# Patient Record
Sex: MALE | Race: WHITE | NOT HISPANIC OR LATINO | Employment: FULL TIME | URBAN - METROPOLITAN AREA
[De-identification: names, ages, dates, MRNs, and addresses within clinical notes are randomized per-mention and may not be internally consistent; named-entity substitution may affect disease eponyms.]

---

## 2019-04-08 ENCOUNTER — OFFICE VISIT (OUTPATIENT)
Dept: INTERNAL MEDICINE | Facility: CLINIC | Age: 34
End: 2019-04-08

## 2019-04-08 VITALS
BODY MASS INDEX: 27.98 KG/M2 | DIASTOLIC BLOOD PRESSURE: 80 MMHG | HEIGHT: 75 IN | WEIGHT: 225 LBS | HEART RATE: 72 BPM | SYSTOLIC BLOOD PRESSURE: 142 MMHG

## 2019-04-08 DIAGNOSIS — S39.011A STRAIN OF ABDOMINAL MUSCLE, INITIAL ENCOUNTER: Primary | ICD-10-CM

## 2019-04-08 PROBLEM — J30.9 ALLERGIC RHINITIS: Status: ACTIVE | Noted: 2019-04-08

## 2019-04-08 PROBLEM — E78.2 MIXED HYPERLIPIDEMIA: Status: ACTIVE | Noted: 2019-04-08

## 2019-04-08 PROBLEM — K40.90 RIGHT INGUINAL HERNIA: Status: ACTIVE | Noted: 2019-04-08

## 2019-04-08 PROCEDURE — 99213 OFFICE O/P EST LOW 20 MIN: CPT | Performed by: INTERNAL MEDICINE

## 2019-04-08 NOTE — PROGRESS NOTES
[facility]     Antonio Durham  1985   [id number]      Patient Care Team:  Shiva Johnson MD as PCP - General (Internal Medicine)    Chief Complaint::   Chief Complaint   Patient presents with   • Hernia   • Hyperlipidemia   • Allergic Rhinitis        HPI  Mr. Durham comes in concerned about lower abdominal pain, and is concerned he has a hernia.  He admits that he had been doing some abdominal exercises prior and then noticed pain not in the inguinal area but in the lower abdominal area.    Chronic Conditions:      Patient Active Problem List   Diagnosis   • Allergic rhinitis   • Mixed hyperlipidemia   • Right inguinal hernia        No past medical history on file.    No past surgical history on file.    Family History   Problem Relation Age of Onset   • Diabetes Father    • Coronary artery disease Father         premature   • Heart attack Father 36   • Coronary artery disease Maternal Uncle 40        premature   • Cancer Other    • Diabetes Other    • Heart attack Other    • Hypertension Other        Social History     Socioeconomic History   • Marital status:      Spouse name: Not on file   • Number of children: Not on file   • Years of education: Not on file   • Highest education level: Not on file   Tobacco Use   • Smoking status: Never Smoker   • Smokeless tobacco: Never Used   Substance and Sexual Activity   • Alcohol use: Yes   • Drug use: No   • Sexual activity: Defer       No Known Allergies    No current outpatient medications on file.    Review of Systems   Constitutional: Negative for chills, fatigue and fever.   HENT: Negative for congestion, ear pain and sinus pressure.    Respiratory: Negative for cough, chest tightness, shortness of breath and wheezing.    Cardiovascular: Negative for chest pain and palpitations.   Gastrointestinal: Positive for abdominal pain. Negative for blood in stool and constipation.   Skin: Negative for color change.   Allergic/Immunologic:  "Negative for environmental allergies.   Neurological: Negative for dizziness, speech difficulty and headache.   Psychiatric/Behavioral: Negative for decreased concentration. The patient is not nervous/anxious.         Vital Signs  Vitals:    04/08/19 1542   BP: 142/80   BP Location: Left arm   Patient Position: Sitting   Cuff Size: Adult   Pulse: 72   Weight: 102 kg (225 lb)   Height: 190.5 cm (75\")       Physical Exam   Constitutional: He is oriented to person, place, and time. He appears well-developed and well-nourished.   HENT:   Head: Normocephalic and atraumatic.   Cardiovascular: Normal rate, regular rhythm and normal heart sounds.   No murmur heard.  Pulmonary/Chest: Effort normal and breath sounds normal.   Abdominal: Soft. Bowel sounds are normal. He exhibits no mass. There is no tenderness. No hernia.   Neurological: He is alert and oriented to person, place, and time.   Psychiatric: He has a normal mood and affect.   Vitals reviewed.     Procedures      Assessment/Plan:    Abdominal wall muscle strain, no evidence of ventral hernia, he will reduce the abdominal portion of his workout for the next few days.    Plan of care reviewed with patient at the conclusion of today's visit. Education was provided regarding diagnosis, management, and any prescribed or recommended OTC medications.Patient verbalizes understanding of and agreement with management plan.         Shiva Johnson MD           "

## 2019-04-22 PROBLEM — Z00.00 ANNUAL PHYSICAL EXAM: Status: ACTIVE | Noted: 2019-04-22

## 2021-04-12 RX ORDER — PREDNISONE 10 MG/1
TABLET ORAL
Qty: 30 TABLET | Refills: 0 | Status: SHIPPED | OUTPATIENT
Start: 2021-04-12 | End: 2022-05-03

## 2022-05-03 ENCOUNTER — OFFICE VISIT (OUTPATIENT)
Dept: INTERNAL MEDICINE | Facility: CLINIC | Age: 37
End: 2022-05-03

## 2022-05-03 VITALS
WEIGHT: 252.4 LBS | TEMPERATURE: 98.4 F | DIASTOLIC BLOOD PRESSURE: 82 MMHG | HEIGHT: 74 IN | BODY MASS INDEX: 32.39 KG/M2 | SYSTOLIC BLOOD PRESSURE: 136 MMHG | HEART RATE: 72 BPM

## 2022-05-03 DIAGNOSIS — M25.512 ACUTE PAIN OF LEFT SHOULDER: ICD-10-CM

## 2022-05-03 DIAGNOSIS — M54.2 NECK PAIN: Primary | ICD-10-CM

## 2022-05-03 PROCEDURE — 99203 OFFICE O/P NEW LOW 30 MIN: CPT | Performed by: INTERNAL MEDICINE

## 2022-05-03 PROCEDURE — 96372 THER/PROPH/DIAG INJ SC/IM: CPT | Performed by: INTERNAL MEDICINE

## 2022-05-03 RX ORDER — TRIAMCINOLONE ACETONIDE 40 MG/ML
80 INJECTION, SUSPENSION INTRA-ARTICULAR; INTRAMUSCULAR ONCE
Status: COMPLETED | OUTPATIENT
Start: 2022-05-03 | End: 2022-05-03

## 2022-05-03 RX ORDER — METHYLPREDNISOLONE 4 MG/1
TABLET ORAL
Qty: 21 EACH | Refills: 0 | Status: SHIPPED | OUTPATIENT
Start: 2022-05-03 | End: 2022-11-15

## 2022-05-03 RX ADMIN — TRIAMCINOLONE ACETONIDE 80 MG: 40 INJECTION, SUSPENSION INTRA-ARTICULAR; INTRAMUSCULAR at 10:38

## 2022-05-03 NOTE — PROGRESS NOTES
"[facility]     Antonio Durham  1985   [id number]      Patient Care Team:  Shiva Johnson MD as PCP - General (Internal Medicine)    Chief Complaint::   Chief Complaint   Patient presents with   • Establish Care     Re-establish - last seen 2019   • Neck Pain        HPI  The patient presents to clinic today for evaluation of neck pain.    Neck pain  The patient expresses a new onset of neck pain. He notes his pain radiated distally to his trapezius and towards the back of his neck. He rates his pain 3 to 4 out of 10, but is exacerbated to a 10 out of 10 with positional changes. The patient notes he recently had a baby 3 to 4 weeks ago, and has been holding him on his left side that aggravated his symptoms. He relays on 04/29/2022 he worked out, and through the night in to the morning of 04/30/2022 his symptoms were exacerbated and tina a \"crick\" in his neck, and he is unable to sleep or lay down. His symptoms were mildly alleviated on 05/01/2022.    He states on 05/02/2022 they had arranged for class this week for firearms, and he could not bend down or  his magazines off the ground. The patient admits to being able to use his arms, and his pain is isolated. He has tried Icy Hot, Biofreeze, a heating pad, and a muscle relaxer, with minimal to no improvement. Additionally, he has been taking Tylenol and ibuprofen 400 mg for the last 3 days. He denies any recent physical therapy treatment.   Chronic Conditions:      Patient Active Problem List   Diagnosis   • Allergic rhinitis   • Mixed hyperlipidemia   • Right inguinal hernia   • Annual physical exam        No past medical history on file.    No past surgical history on file.    Family History   Problem Relation Age of Onset   • Diabetes Father    • Coronary artery disease Father         premature   • Heart attack Father 36   • Coronary artery disease Maternal Uncle 40        premature   • Cancer Other    • Diabetes Other    • Heart " "attack Other    • Hypertension Other        Social History     Socioeconomic History   • Marital status:    Tobacco Use   • Smoking status: Never Smoker   • Smokeless tobacco: Never Used   Substance and Sexual Activity   • Alcohol use: Yes   • Drug use: No   • Sexual activity: Defer       No Known Allergies      Current Outpatient Medications:   •  methylPREDNISolone (MEDROL) 4 MG dose pack, Take as directed on package instructions., Disp: 21 each, Rfl: 0  No current facility-administered medications for this visit.    Review of Systems   Constitutional: Negative for chills, fatigue and fever.   HENT: Negative for congestion, ear pain and sinus pressure.    Respiratory: Negative for cough, chest tightness, shortness of breath and wheezing.    Cardiovascular: Negative for chest pain and palpitations.   Gastrointestinal: Negative for abdominal pain, blood in stool and constipation.   Musculoskeletal: Positive for neck pain.   Skin: Negative for color change.   Allergic/Immunologic: Negative for environmental allergies.   Neurological: Negative for dizziness, speech difficulty and headache.   Psychiatric/Behavioral: Negative for decreased concentration. The patient is not nervous/anxious.         Vital Signs  Vitals:    05/03/22 0950   BP: 136/82   BP Location: Left arm   Patient Position: Sitting   Cuff Size: Large Adult   Pulse: 72   Temp: 98.4 °F (36.9 °C)   Weight: 114 kg (252 lb 6.4 oz)   Height: 188 cm (74\")   PainSc: 10-Worst pain ever  Comment: at worst   3 otherwise   PainLoc: Neck       Physical Exam  Vitals reviewed.   Constitutional:       Appearance: He is well-developed.   HENT:      Head: Normocephalic and atraumatic.   Cardiovascular:      Rate and Rhythm: Normal rate and regular rhythm.      Heart sounds: Normal heart sounds. No murmur heard.  Pulmonary:      Effort: Pulmonary effort is normal.      Breath sounds: Normal breath sounds.   Musculoskeletal:         General: Tenderness present.      " Comments: Significant tenderness in the left trapezius from the shoulder to the lower skull.   There is slight decreased range of motion of the neck.    Neurological:      Mental Status: He is alert and oriented to person, place, and time.          Procedures    ACE III MINI             Assessment/Plan:    Diagnoses and all orders for this visit:    1. Neck pain (Primary)  -     Ambulatory Referral to Physical Therapy Evaluate and treat  -     triamcinolone acetonide (KENALOG-40) injection 80 mg    2. Acute pain of left shoulder  -     Ambulatory Referral to Physical Therapy Evaluate and treat  -     triamcinolone acetonide (KENALOG-40) injection 80 mg    Other orders  -     methylPREDNISolone (MEDROL) 4 MG dose pack; Take as directed on package instructions.  Dispense: 21 each; Refill: 0        1. Neck and shoulder pain due to intense muscle spasm       -  Likely due to overuse.       -  He is provided with parental triamcinolone.       -  I have suggested 600 mg ibuprofen twice a day with meals along with 500 mg of acetaminophen.       -  We also discussed going to PT and he is provided a Medrol Dosepak.    He will follow-up in 6 months for his physical.      Plan of care reviewed with patient at the conclusion of today's visit. Education was provided regarding diagnosis, management, and any prescribed or recommended OTC medications.Patient verbalizes understanding of and agreement with management plan.         Shiva Johnson MD         Transcribed from ambient dictation for Shiva Johnson MD by Jude Astorga.  05/03/22   10:52 EDT    Patient verbalized consent to the visit recording.

## 2022-05-11 ENCOUNTER — PATIENT ROUNDING (BHMG ONLY) (OUTPATIENT)
Dept: INTERNAL MEDICINE | Facility: CLINIC | Age: 37
End: 2022-05-11

## 2022-11-15 ENCOUNTER — OFFICE VISIT (OUTPATIENT)
Dept: INTERNAL MEDICINE | Facility: CLINIC | Age: 37
End: 2022-11-15

## 2022-11-15 VITALS
TEMPERATURE: 97.8 F | HEIGHT: 74 IN | BODY MASS INDEX: 31.18 KG/M2 | HEART RATE: 80 BPM | WEIGHT: 243 LBS | SYSTOLIC BLOOD PRESSURE: 130 MMHG | DIASTOLIC BLOOD PRESSURE: 82 MMHG

## 2022-11-15 DIAGNOSIS — E78.2 MIXED HYPERLIPIDEMIA: ICD-10-CM

## 2022-11-15 DIAGNOSIS — Z00.00 ANNUAL PHYSICAL EXAM: Primary | ICD-10-CM

## 2022-11-15 PROCEDURE — 99395 PREV VISIT EST AGE 18-39: CPT | Performed by: INTERNAL MEDICINE

## 2022-11-15 RX ORDER — ROSUVASTATIN CALCIUM 10 MG/1
10 TABLET, COATED ORAL DAILY
Qty: 90 TABLET | Refills: 3 | Status: SHIPPED | OUTPATIENT
Start: 2022-11-15

## 2022-11-15 NOTE — PROGRESS NOTES
[facility]     Antonio Durham  1985   [id number]      Patient Care Team:  Shiva Johnson MD as PCP - General (Internal Medicine)    Chief Complaint::   Chief Complaint   Patient presents with   • Annual Exam        HPI  The patient presents today for an annual examination and for follow up of hyperlipidemia. He has no major complaints.    Hyperlipidemia  The patient states that he is doing well overall. He notes he is doing functional fitness, CrossFit, and yard work 3 times a week. He states he also does Jiu-Jistu 1 to 2 times a week. He reports he has a family history of coronary artery disease.    Chronic Conditions:  Hyperlipidemia    Patient Active Problem List   Diagnosis   • Allergic rhinitis   • Mixed hyperlipidemia   • Right inguinal hernia   • Annual physical exam        No past medical history on file.    No past surgical history on file.    Family History   Problem Relation Age of Onset   • Diabetes Father    • Coronary artery disease Father         premature   • Heart attack Father 36   • Coronary artery disease Maternal Uncle 40        premature   • Cancer Other    • Diabetes Other    • Heart attack Other    • Hypertension Other        Social History     Socioeconomic History   • Marital status:    Tobacco Use   • Smoking status: Never   • Smokeless tobacco: Never   Substance and Sexual Activity   • Alcohol use: Yes   • Drug use: No   • Sexual activity: Defer       No Known Allergies      Current Outpatient Medications:   •  Boswellia-Glucosamine-Vit D (OSTEO BI-FLEX ONE PER DAY PO), Take 2 tablets by mouth Daily., Disp: , Rfl:   •  rosuvastatin (Crestor) 10 MG tablet, Take 1 tablet by mouth Daily., Disp: 90 tablet, Rfl: 3    Immunization History   Administered Date(s) Administered   • Flu Vaccine Split Quad 10/14/2015, 10/16/2017, 10/20/2019   • Hepatitis A 10/16/2018, 01/01/2019   • Hepatitis B 01/01/2007   • Influenza, Unspecified 10/15/2018   • PPD Test  "05/16/2014, 06/03/2015, 08/15/2016, 08/09/2017   • Td 07/14/2015   • Tdap 01/01/2007, 01/15/2015        Health Maintenance Due   Topic Date Due   • COVID-19 Vaccine (1) Never done   • ANNUAL PHYSICAL  10/26/2018   • HEPATITIS C SCREENING  Never done   • INFLUENZA VACCINE  08/01/2022        Review of Systems     Vital Signs  Vitals:    11/15/22 0828   BP: 130/82   BP Location: Left arm   Patient Position: Sitting   Cuff Size: Large Adult   Pulse: 80   Temp: 97.8 °F (36.6 °C)   Weight: 110 kg (243 lb)   Height: 189 cm (74.41\")   PainSc: 0-No pain       Physical Exam  Vitals and nursing note reviewed.   Constitutional:       General: He is not in acute distress.     Appearance: He is well-developed. He is not diaphoretic.   HENT:      Head: Normocephalic and atraumatic.   Eyes:      Conjunctiva/sclera: Conjunctivae normal.   Pulmonary:      Effort: Pulmonary effort is normal. No respiratory distress.   Skin:     General: Skin is warm.      Capillary Refill: Capillary refill takes less than 2 seconds.   Neurological:      Mental Status: He is alert.          Procedures     Fall Risk Screen:  STEADI Fall Risk Assessment has not been completed.    Health Habits and Functional and Cognitive Screening:  No flowsheet data found.    Depression [place]  PHQ-9 Total Score:       ACE III MINI             Assessment/Plan:    Diagnoses and all orders for this visit:    1. Annual physical exam (Primary)    2. Mixed hyperlipidemia  -     Cancel: Lipid Panel; Future  -     Cancel: Comprehensive Metabolic Panel; Future  -     Cancel: Apolipoprotein B; Future  -     Cancel: Lipoprotein A (LPA); Future  -     Comprehensive Metabolic Panel; Future  -     Lipid Panel; Future  -     Apolipoprotein B; Future  -     Lipoprotein A (LPA); Future    Other orders  -     rosuvastatin (Crestor) 10 MG tablet; Take 1 tablet by mouth Daily.  Dispense: 90 tablet; Refill: 3    1. Prevention  - Overall, he remains in excellent health with very good " lifestyle habits.    2. Hyperlipidemia  - Low-density lipoprotein is 172 mg/dL with a low high-density lipoprotein. He has a significant family history of coronary artery disease, so I think it is time for him to be on a statin. After discussion, he will start rosuvastatin 10 mg a day and repeat labs in 3 months.     Follow up in 1 year.     BMI is >= 30 and <35. (Class 1 Obesity). The following options were offered after discussion;: exercise counseling/recommendations and nutrition counseling/recommendations     It should be noted that this pt has essentially no body fat and does strength training on a regular basis.  He is not obese.       Labs  No results found for this or any previous visit.     Counseling was given to patient for the following topics: appropriate exercise 150 minutes per week, disease prevention and healthy eating habits.    Plan of care reviewed with patient at the conclusion of today's visit. Education was provided regarding diagnosis, management, and any prescribed or recommended OTC medications.Patient verbalizes understanding of and agreement with management plan.         Transcribed from ambient dictation for Shiva Johnson MD by Siddhartha Salgado.  11/15/22   11:31 EST    Patient or patient representative verbalized consent to the visit recording.  I have personally performed the services described in this document as transcribed by the above individual, and it is both accurate and complete.

## 2022-12-02 ENCOUNTER — OFFICE VISIT (OUTPATIENT)
Dept: INTERNAL MEDICINE | Facility: CLINIC | Age: 37
End: 2022-12-02

## 2022-12-02 VITALS
SYSTOLIC BLOOD PRESSURE: 148 MMHG | TEMPERATURE: 97.7 F | HEIGHT: 74 IN | HEART RATE: 64 BPM | BODY MASS INDEX: 31.32 KG/M2 | WEIGHT: 244 LBS | DIASTOLIC BLOOD PRESSURE: 94 MMHG

## 2022-12-02 DIAGNOSIS — T14.8XXA MUSCLE STRAIN: ICD-10-CM

## 2022-12-02 DIAGNOSIS — R03.0 ELEVATED BP WITHOUT DIAGNOSIS OF HYPERTENSION: ICD-10-CM

## 2022-12-02 DIAGNOSIS — M54.6 ACUTE RIGHT-SIDED THORACIC BACK PAIN: Primary | ICD-10-CM

## 2022-12-02 PROCEDURE — 99213 OFFICE O/P EST LOW 20 MIN: CPT | Performed by: NURSE PRACTITIONER

## 2022-12-02 PROCEDURE — 96372 THER/PROPH/DIAG INJ SC/IM: CPT | Performed by: NURSE PRACTITIONER

## 2022-12-02 RX ORDER — CYCLOBENZAPRINE HCL 10 MG
10 TABLET ORAL 3 TIMES DAILY PRN
Qty: 30 TABLET | Refills: 0 | Status: SHIPPED | OUTPATIENT
Start: 2022-12-02

## 2022-12-02 RX ORDER — PREDNISONE 10 MG/1
TABLET ORAL
Qty: 21 TABLET | Refills: 0 | Status: SHIPPED | OUTPATIENT
Start: 2022-12-02

## 2022-12-02 RX ORDER — TRIAMCINOLONE ACETONIDE 40 MG/ML
80 INJECTION, SUSPENSION INTRA-ARTICULAR; INTRAMUSCULAR ONCE
Status: COMPLETED | OUTPATIENT
Start: 2022-12-02 | End: 2022-12-02

## 2022-12-02 RX ADMIN — TRIAMCINOLONE ACETONIDE 80 MG: 40 INJECTION, SUSPENSION INTRA-ARTICULAR; INTRAMUSCULAR at 12:11

## 2022-12-02 NOTE — PROGRESS NOTES
Follow Up Office Visit      Patient Name: Antonio Durham  : 1985   MRN: [id number]   Care Team: Patient Care Team:  Shiva Johnson MD as PCP - General (Internal Medicine)    Chief Complaint:    Chief Complaint   Patient presents with   • Back Pain       History of Present Illness: Antonio Durham is a 37 y.o. male presents to the clinic for a new acute issue of back pain.    He reports he notes he was in the clinic for back pain in 2022 for a different area of back pain, in which Dr. Johnson gave him a steroid injection for. He states his pain. He states he noticed some pain the previous week while performing kettle exercises; however, on 2022 while he was performing Jujitsu, he noticed a more severe onset of pain after having to push someone off of him. He notes his pain is in the upper right side of his back behind his shoulder blade, which he feels could be his lat muscle. He denies any weakness or blurred vision, but notes having a headache currently. He states he has taken Flexeril previously. He reports his wife is going to [place] and he will be caring for 3 children. The patient notes he still has a baby that takes a bottle and states they take turns staying up. Last night was the night he should have got to sleep; however, had issues. The patient states he has been using a heating pad.    He acknowledges his blood pressure is elevated; however, notes it is typically in the 130/80 mmHg range at home. He reports he iis supposed to review his labs with Dr. Johnson in 2023.    Subjective      Review of Systems:   Review of Systems   Constitutional: Negative for appetite change.   Eyes: Negative for visual disturbance.   Respiratory: Negative for cough and shortness of breath.    Cardiovascular: Negative for chest pain and palpitations.   Musculoskeletal: Positive for myalgias and neck stiffness.   Neurological: Positive for headache.       I have reviewed and the following  "portions of the patient's history were updated as appropriate: past family history, past medical history, past social history, past surgical history and problem list.    Medications:     Current Outpatient Medications:   •  Boswellia-Glucosamine-Vit D (OSTEO BI-FLEX ONE PER DAY PO), Take 2 tablets by mouth Daily., Disp: , Rfl:   •  rosuvastatin (Crestor) 10 MG tablet, Take 1 tablet by mouth Daily., Disp: 90 tablet, Rfl: 3  •  cyclobenzaprine (FLEXERIL) 10 MG tablet, Take 1 tablet by mouth 3 (Three) Times a Day As Needed for Muscle Spasms., Disp: 30 tablet, Rfl: 0  •  predniSONE (DELTASONE) 10 MG tablet, Take 6 tablets on day 1, 5 tablets day 2, 4 tablets day 3, 3 tablets day 4, 2 tablets day 5, and 1 tablet day 6, Disp: 21 tablet, Rfl: 0    Current Facility-Administered Medications:   •  triamcinolone acetonide (KENALOG-40) injection 80 mg, 80 mg, Intramuscular, Once, Kala Garcia APRN    Allergies:   No Known Allergies    Objective     Physical Exam:  Vital Signs:   Vitals:    12/02/22 1146   BP: 148/94   BP Location: Left arm   Patient Position: Sitting   Cuff Size: Large Adult   Pulse: 64   Temp: 97.7 °F (36.5 °C)   TempSrc: Temporal   Weight: 111 kg (244 lb)   Height: 189 cm (74.41\")   PainSc:   1   PainLoc: Back     Body mass index is 30.98 kg/m².     Physical Exam  Vitals and nursing note reviewed.   Constitutional:       General: He is not in acute distress.  HENT:      Head: Normocephalic and atraumatic.      Comments: Patient wearing facial mask.  Cardiovascular:      Rate and Rhythm: Normal rate and regular rhythm.   Pulmonary:      Effort: Pulmonary effort is normal. No respiratory distress.      Breath sounds: No wheezing.   Musculoskeletal:         General: Tenderness present.      Comments: Tenderness noted to the subscapular area with passive and active range of motion of the right upper extremity.  Noted some tenderness to palpation as well.   Neurological:      Mental Status: He is alert. "      Comments: Bilateral  equal and intact   Psychiatric:         Mood and Affect: Mood normal.         Thought Content: Thought content normal.         Judgment: Judgment normal.         Assessment / Plan      Assessment/Plan:   Problems Addressed This Visit    ICD-10-CM ICD-9-CM   1. Acute right-sided thoracic back pain  M54.6 724.1   2. Muscle strain  T14.8XXA 848.9   3. Elevated BP without diagnosis of hypertension  R03.0 796.2      Acute right-sided thoracic back pain  Suspected muscle strain  -Kenalog 80 mg IM administered today  -Plan to start prednisone 10 mg Dosepak taper tomorrow  -Flexeril 10 mg 3 times daily as needed  -Encouraged to continue daily stretching and heat application as needed    Elevated BP without diagnosis of hypertension  -Suspect likely pain as a factor.  Patient states he is also been up since 2 AM due to pain  -Encourage patient to monitor BP at home, plan for follow-up if BP remains elevated    Plan of care reviewed with patient at the conclusion of today's visit. Education was provided regarding diagnosis and management.  Patient verbalizes understanding of and agreement with management plan.      Follow Up:   Return for Next scheduled follow up.        RUBEN Wooten  [facility] [address] [place] [address]    Please note that portions of this note were completed with a voice recognition program.  Transcribed from ambient dictation for RUBEN Angeles by Shara Rojas.  12/02/22   13:34 EST    Patient or patient representative verbalized consent to the visit recording.  I have personally performed the services described in this document as transcribed by the above individual, and it is both accurate and complete.

## 2023-02-08 ENCOUNTER — LAB (OUTPATIENT)
Dept: LAB | Facility: HOSPITAL | Age: 38
End: 2023-02-08
Payer: COMMERCIAL

## 2023-02-08 DIAGNOSIS — E78.2 MIXED HYPERLIPIDEMIA: ICD-10-CM

## 2023-02-08 LAB
ALBUMIN SERPL-MCNC: 4.5 G/DL (ref 3.5–5.2)
ALBUMIN/GLOB SERPL: 1.5 G/DL
ALP SERPL-CCNC: 72 U/L (ref 39–117)
ALT SERPL W P-5'-P-CCNC: 31 U/L (ref 1–41)
ANION GAP SERPL CALCULATED.3IONS-SCNC: 10.6 MMOL/L (ref 5–15)
AST SERPL-CCNC: 37 U/L (ref 1–40)
BILIRUB SERPL-MCNC: 0.5 MG/DL (ref 0–1.2)
BUN SERPL-MCNC: 14 MG/DL (ref 6–20)
BUN/CREAT SERPL: 13.2 (ref 7–25)
CALCIUM SPEC-SCNC: 9.2 MG/DL (ref 8.6–10.5)
CHLORIDE SERPL-SCNC: 105 MMOL/L (ref 98–107)
CHOLEST SERPL-MCNC: 139 MG/DL (ref 0–200)
CO2 SERPL-SCNC: 24.4 MMOL/L (ref 22–29)
CREAT SERPL-MCNC: 1.06 MG/DL (ref 0.76–1.27)
EGFRCR SERPLBLD CKD-EPI 2021: 91.6 ML/MIN/1.73
GLOBULIN UR ELPH-MCNC: 3.1 GM/DL
GLUCOSE SERPL-MCNC: 104 MG/DL (ref 65–99)
HDLC SERPL-MCNC: 38 MG/DL (ref 40–60)
LDLC SERPL CALC-MCNC: 89 MG/DL (ref 0–100)
LDLC/HDLC SERPL: 2.35 {RATIO}
POTASSIUM SERPL-SCNC: 4.4 MMOL/L (ref 3.5–5.2)
PROT SERPL-MCNC: 7.6 G/DL (ref 6–8.5)
SODIUM SERPL-SCNC: 140 MMOL/L (ref 136–145)
TRIGL SERPL-MCNC: 58 MG/DL (ref 0–150)
VLDLC SERPL-MCNC: 12 MG/DL (ref 5–40)

## 2023-02-08 PROCEDURE — 36415 COLL VENOUS BLD VENIPUNCTURE: CPT

## 2023-02-08 PROCEDURE — 83695 ASSAY OF LIPOPROTEIN(A): CPT

## 2023-02-08 PROCEDURE — 82172 ASSAY OF APOLIPOPROTEIN: CPT

## 2023-02-08 PROCEDURE — 80053 COMPREHEN METABOLIC PANEL: CPT

## 2023-02-08 PROCEDURE — 80061 LIPID PANEL: CPT

## 2023-02-09 LAB — LPA SERPL-SCNC: 264.3 NMOL/L

## 2023-02-10 LAB — APO B SERPL-MCNC: 77 MG/DL

## 2023-11-01 RX ORDER — ROSUVASTATIN CALCIUM 10 MG/1
10 TABLET, COATED ORAL DAILY
Qty: 90 TABLET | Refills: 3 | Status: SHIPPED | OUTPATIENT
Start: 2023-11-01

## 2025-01-07 RX ORDER — ROSUVASTATIN CALCIUM 10 MG/1
10 TABLET, COATED ORAL DAILY
Qty: 90 TABLET | Refills: 3 | OUTPATIENT
Start: 2025-01-07

## 2025-01-07 NOTE — TELEPHONE ENCOUNTER
Rx Refill Note  Requested Prescriptions     Pending Prescriptions Disp Refills    rosuvastatin (CRESTOR) 10 MG tablet [Pharmacy Med Name: ROSUVASTATIN CALCIUM 10 MG TAB] 90 tablet 3     Sig: TAKE 1 TABLET BY MOUTH DAILY      Last office visit with prescribing clinician: Visit date not found   Last telemedicine visit with prescribing clinician: Visit date not found   Next office visit with prescribing clinician: Visit date not found                         Would you like a call back once the refill request has been completed: [] Yes [] No    If the office needs to give you a call back, can they leave a voicemail: [] Yes [] No    Katelyn Troncoso LPN  01/07/25, 10:52 EST

## 2025-01-10 RX ORDER — ROSUVASTATIN CALCIUM 10 MG/1
10 TABLET, COATED ORAL DAILY
Qty: 90 TABLET | Refills: 3 | OUTPATIENT
Start: 2025-01-10

## 2025-01-14 RX ORDER — ROSUVASTATIN CALCIUM 10 MG/1
10 TABLET, COATED ORAL DAILY
Qty: 90 TABLET | Refills: 3 | Status: CANCELLED | OUTPATIENT
Start: 2025-01-14

## 2025-01-14 RX ORDER — ROSUVASTATIN CALCIUM 10 MG/1
10 TABLET, COATED ORAL DAILY
Qty: 90 TABLET | Refills: 0 | Status: SHIPPED | OUTPATIENT
Start: 2025-01-14

## 2025-02-28 ENCOUNTER — LAB (OUTPATIENT)
Dept: LAB | Facility: HOSPITAL | Age: 40
End: 2025-02-28
Payer: COMMERCIAL

## 2025-02-28 ENCOUNTER — OFFICE VISIT (OUTPATIENT)
Dept: INTERNAL MEDICINE | Facility: CLINIC | Age: 40
End: 2025-02-28
Payer: COMMERCIAL

## 2025-02-28 VITALS
HEART RATE: 60 BPM | HEIGHT: 75 IN | TEMPERATURE: 98.4 F | SYSTOLIC BLOOD PRESSURE: 136 MMHG | BODY MASS INDEX: 28.97 KG/M2 | WEIGHT: 233 LBS | DIASTOLIC BLOOD PRESSURE: 86 MMHG

## 2025-02-28 DIAGNOSIS — E78.2 MIXED HYPERLIPIDEMIA: ICD-10-CM

## 2025-02-28 DIAGNOSIS — R53.83 OTHER FATIGUE: ICD-10-CM

## 2025-02-28 DIAGNOSIS — R73.09 ABNORMAL GLUCOSE: ICD-10-CM

## 2025-02-28 DIAGNOSIS — Z13.0 SCREENING FOR DEFICIENCY ANEMIA: ICD-10-CM

## 2025-02-28 DIAGNOSIS — Z00.00 ANNUAL PHYSICAL EXAM: Primary | ICD-10-CM

## 2025-02-28 LAB
ALBUMIN SERPL-MCNC: 4.4 G/DL (ref 3.5–5.2)
ALBUMIN/GLOB SERPL: 1.5 G/DL
ALP SERPL-CCNC: 74 U/L (ref 39–117)
ALT SERPL W P-5'-P-CCNC: 30 U/L (ref 1–41)
ANION GAP SERPL CALCULATED.3IONS-SCNC: 8.8 MMOL/L (ref 5–15)
AST SERPL-CCNC: 32 U/L (ref 1–40)
BASOPHILS # BLD AUTO: 0.03 10*3/MM3 (ref 0–0.2)
BASOPHILS NFR BLD AUTO: 0.5 % (ref 0–1.5)
BILIRUB SERPL-MCNC: 0.7 MG/DL (ref 0–1.2)
BUN SERPL-MCNC: 16 MG/DL (ref 6–20)
BUN/CREAT SERPL: 14.8 (ref 7–25)
CALCIUM SPEC-SCNC: 9 MG/DL (ref 8.6–10.5)
CHLORIDE SERPL-SCNC: 102 MMOL/L (ref 98–107)
CHOLEST SERPL-MCNC: 150 MG/DL (ref 0–200)
CO2 SERPL-SCNC: 25.2 MMOL/L (ref 22–29)
CREAT SERPL-MCNC: 1.08 MG/DL (ref 0.76–1.27)
DEPRECATED RDW RBC AUTO: 43.6 FL (ref 37–54)
EGFRCR SERPLBLD CKD-EPI 2021: 88.4 ML/MIN/1.73
EOSINOPHIL # BLD AUTO: 0.22 10*3/MM3 (ref 0–0.4)
EOSINOPHIL NFR BLD AUTO: 3.7 % (ref 0.3–6.2)
ERYTHROCYTE [DISTWIDTH] IN BLOOD BY AUTOMATED COUNT: 12.7 % (ref 12.3–15.4)
GLOBULIN UR ELPH-MCNC: 3 GM/DL
GLUCOSE SERPL-MCNC: 97 MG/DL (ref 65–99)
HBA1C MFR BLD: 5.5 % (ref 4.8–5.6)
HCT VFR BLD AUTO: 46 % (ref 37.5–51)
HCYS SERPL-MCNC: 5.8 UMOL/L (ref 0–15)
HDLC SERPL-MCNC: 32 MG/DL (ref 40–60)
HGB BLD-MCNC: 15.3 G/DL (ref 13–17.7)
IMM GRANULOCYTES # BLD AUTO: 0.01 10*3/MM3 (ref 0–0.05)
IMM GRANULOCYTES NFR BLD AUTO: 0.2 % (ref 0–0.5)
LDLC SERPL CALC-MCNC: 102 MG/DL (ref 0–100)
LDLC/HDLC SERPL: 3.15 {RATIO}
LYMPHOCYTES # BLD AUTO: 2.9 10*3/MM3 (ref 0.7–3.1)
LYMPHOCYTES NFR BLD AUTO: 49.2 % (ref 19.6–45.3)
MCH RBC QN AUTO: 31.5 PG (ref 26.6–33)
MCHC RBC AUTO-ENTMCNC: 33.3 G/DL (ref 31.5–35.7)
MCV RBC AUTO: 94.7 FL (ref 79–97)
MONOCYTES # BLD AUTO: 0.54 10*3/MM3 (ref 0.1–0.9)
MONOCYTES NFR BLD AUTO: 9.2 % (ref 5–12)
NEUTROPHILS NFR BLD AUTO: 2.19 10*3/MM3 (ref 1.7–7)
NEUTROPHILS NFR BLD AUTO: 37.2 % (ref 42.7–76)
NRBC BLD AUTO-RTO: 0 /100 WBC (ref 0–0.2)
PLATELET # BLD AUTO: 217 10*3/MM3 (ref 140–450)
PMV BLD AUTO: 10.6 FL (ref 6–12)
POTASSIUM SERPL-SCNC: 4.2 MMOL/L (ref 3.5–5.2)
PROT SERPL-MCNC: 7.4 G/DL (ref 6–8.5)
RBC # BLD AUTO: 4.86 10*6/MM3 (ref 4.14–5.8)
SODIUM SERPL-SCNC: 136 MMOL/L (ref 136–145)
TESTOST SERPL-MCNC: 413 NG/DL (ref 249–836)
TRIGL SERPL-MCNC: 86 MG/DL (ref 0–150)
VLDLC SERPL-MCNC: 16 MG/DL (ref 5–40)
WBC NRBC COR # BLD AUTO: 5.89 10*3/MM3 (ref 3.4–10.8)

## 2025-02-28 PROCEDURE — 82172 ASSAY OF APOLIPOPROTEIN: CPT

## 2025-02-28 PROCEDURE — 80053 COMPREHEN METABOLIC PANEL: CPT

## 2025-02-28 PROCEDURE — 83090 ASSAY OF HOMOCYSTEINE: CPT

## 2025-02-28 PROCEDURE — 84403 ASSAY OF TOTAL TESTOSTERONE: CPT

## 2025-02-28 PROCEDURE — 83036 HEMOGLOBIN GLYCOSYLATED A1C: CPT

## 2025-02-28 PROCEDURE — 85025 COMPLETE CBC W/AUTO DIFF WBC: CPT

## 2025-02-28 PROCEDURE — 36415 COLL VENOUS BLD VENIPUNCTURE: CPT

## 2025-02-28 PROCEDURE — 99396 PREV VISIT EST AGE 40-64: CPT | Performed by: INTERNAL MEDICINE

## 2025-02-28 PROCEDURE — 80061 LIPID PANEL: CPT

## 2025-02-28 NOTE — PROGRESS NOTES
[facility]     Antonio Durham  1985   [id number]      Patient Care Team:  Shiva Johnson MD as PCP - General (Internal Medicine)    Chief Complaint::   Chief Complaint   Patient presents with    Annual Exam        HPI  History of Present Illness  The patient is a 39-year-old male who presents for an annual examination and follow-up of hyperlipidemia.    He reports a general sense of well-being and has been actively engaged in weight loss efforts, resulting in a 16-pound reduction since the beginning of the year. His target weight range is between 210 and 215 pounds. He maintains an active lifestyle, participating in [id number] and functional fitness activities approximately three times per week. He has also made dietary modifications, transitioning to a low-carbohydrate diet.    He expresses interest in having his testosterone levels assessed, citing occasional feelings of exceptional health.      He is currently on a regimen of rosuvastatin.    FAMILY HISTORY  The patient has a family history of heart disease and diabetes.    MEDICATIONS  Rosuvastatin    Chronic Conditions:      Patient Active Problem List   Diagnosis    Allergic rhinitis    Mixed hyperlipidemia    Right inguinal hernia    Annual physical exam        Past Medical History:   Diagnosis Date    Hyperlipidemia        No past surgical history on file.    Family History   Problem Relation Age of Onset    Diabetes Father     Coronary artery disease Father         premature    Heart attack Father 36    Alcohol abuse Father     Coronary artery disease Maternal Uncle 40        premature    Cancer Other     Diabetes Other     Heart attack Other     Hypertension Other     Heart disease Maternal Grandmother     Diabetes Sister        Social History     Socioeconomic History    Marital status:    Tobacco Use    Smoking status: Never    Smokeless tobacco: Never   Vaping Use    Vaping status: Never Used   Substance and Sexual  "Activity    Alcohol use: Yes     Alcohol/week: 5.0 standard drinks of alcohol     Types: 5 Cans of beer per week    Drug use: No    Sexual activity: Yes     Partners: Female       No Known Allergies      Current Outpatient Medications:     Boswellia-Glucosamine-Vit D (OSTEO BI-FLEX ONE PER DAY PO), Take 2 tablets by mouth Daily., Disp: , Rfl:     cyclobenzaprine (FLEXERIL) 10 MG tablet, Take 1 tablet by mouth 3 (Three) Times a Day As Needed for Muscle Spasms., Disp: 30 tablet, Rfl: 0    rosuvastatin (CRESTOR) 10 MG tablet, TAKE 1 TABLET BY MOUTH DAILY, Disp: 90 tablet, Rfl: 0    Immunization History   Administered Date(s) Administered    Flu Vaccine Split Quad 10/14/2015, 10/16/2017, 10/20/2019    Fluzone  >6mos 10/28/2024    Fluzone (or Fluarix & Flulaval for VFC) >6mos 10/25/2017, 10/19/2019, 10/15/2020, 10/23/2021, 11/01/2022, 11/09/2023    Hepatitis A 10/16/2018, 01/01/2019    Hepatitis B Adult/Adolescent IM 01/01/2007    Influenza, Unspecified 10/15/2018, 10/02/2024    PPD Test 05/16/2014, 06/03/2015, 08/15/2016, 08/09/2017    Td (TDVAX) 07/14/2015    Tdap 01/01/2007, 01/15/2015        Health Maintenance Due   Topic Date Due    HEPATITIS C SCREENING  Never done    ANNUAL PHYSICAL  11/15/2023    BMI FOLLOWUP  11/15/2023    COVID-19 Vaccine (1 - 2024-25 season) Never done        Review of Systems   Constitutional: Negative.    Respiratory: Negative.  Negative for chest tightness and shortness of breath.    Cardiovascular: Negative.  Negative for chest pain.   Gastrointestinal:  Negative for abdominal pain, blood in stool, constipation and diarrhea.        Vital Signs  Vitals:    02/28/25 0801   BP: 136/86   BP Location: Left arm   Patient Position: Sitting   Cuff Size: Large Adult   Pulse: 60   Temp: 98.4 °F (36.9 °C)   TempSrc: Temporal   Weight: 106 kg (233 lb)   Height: 190 cm (74.8\")   PainSc: 0-No pain       Physical Exam  Vitals reviewed.   Constitutional:       Appearance: Normal appearance. He is " well-developed.   HENT:      Head: Normocephalic and atraumatic.      Right Ear: Tympanic membrane and ear canal normal.      Left Ear: Tympanic membrane and ear canal normal.      Mouth/Throat:      Pharynx: Oropharynx is clear. No posterior oropharyngeal erythema.   Eyes:      Extraocular Movements: Extraocular movements intact.      Pupils: Pupils are equal, round, and reactive to light.   Neck:      Thyroid: No thyromegaly.      Vascular: No carotid bruit.   Cardiovascular:      Rate and Rhythm: Normal rate and regular rhythm.      Heart sounds: Normal heart sounds. No murmur heard.     No friction rub. No gallop.   Pulmonary:      Effort: Pulmonary effort is normal.      Breath sounds: Normal breath sounds.   Abdominal:      General: Bowel sounds are normal.      Palpations: Abdomen is soft. There is no mass.      Tenderness: There is no abdominal tenderness.   Musculoskeletal:      Cervical back: Normal range of motion and neck supple.      Right lower leg: No edema.      Left lower leg: No edema.   Lymphadenopathy:      Cervical: No cervical adenopathy.   Skin:     General: Skin is warm and dry.   Neurological:      Mental Status: He is alert and oriented to person, place, and time.      Cranial Nerves: No cranial nerve deficit.      Motor: No weakness.      Gait: Gait normal.   Psychiatric:         Mood and Affect: Mood normal.         Behavior: Behavior normal.        Physical Exam  Vital Signs  BMI is 29.    Procedures     Fall Risk Screen:  STEADI Fall Risk Assessment has not been completed.    Health Habits and Functional and Cognitive Screening:       No data to display                Depression [place]  PHQ-9 Total Score:      ACE III MINI             Assessment/Plan:      Assessment & Plan  1. Health maintenance.  He remains in very good health with excellent lifestyle habits, including aggressive workouts most days of the week and dietary improvements. His BMI of 29 does not accurately reflect his  lean weight due to minimal body fat.    2. Hyperlipidemia.  He has a strong family history of cardiovascular disease and diabetes, coupled with a high lipoprotein A level. He has been on rosuvastatin for 2 years. His apolipoprotein B level, last checked 2 years ago, was at goal at 77. However, his LDL level was slightly elevated at 108 during his last check at the [facility]. A lipid panel will be conducted to reassess these levels. If the apolipoprotein B and LDL levels are not within the desired range, he will be given a year to lower these levels through dietary modifications. Ultimately, the goal is to maintain his LDL level below 100 and his apolipoprotein B level under 90. If these targets are not achieved, adjustments to his rosuvastatin dosage will be considered.    3. Abnormal glucose.  His fasting glucose level was 104 two years ago but returned to normal last year. He also has a strong family history of diabetes. The current treatment plan emphasizes a healthy diet and avoidance of weight gain.    4. Elevated blood pressure.  His blood pressure is 136/86, which is not ideal but not alarming. Discussed that the risk of stroke, heart attack, and chronic kidney disease is higher at 140/90 than at 120/70. He is advised to continue his efforts in weight loss and dietary improvements to help lower his blood pressure.    5. Testosterone level check.  A testosterone level test will be added to the blood work to address his curiosity about his levels.    Follow-up  The patient will follow up in 1 year.    BMI is >= 25 and <30. (Overweight) The following options were offered after discussion;: this pt is not overweight.         Labs  Results  Laboratory Studies  LDL cholesterol was 108. Fasting glucose was 104 two years ago and normal last year.    Counseling was given to patient for the following topics: appropriate exercise as he is doing2, disease prevention, and healthy eating habits.    Plan of  care reviewed with patient at the conclusion of today's visit. Education was provided regarding diagnosis, management, and any prescribed or recommended OTC medications.Patient verbalizes understanding of and agreement with management plan.     Patient or patient representative verbalized consent for the use of Ambient Listening during the visit with  Shiva Johnson MD for chart documentation. 3/1/2025  13:52 LU Johnson MD

## 2025-03-02 LAB — APO B SERPL-MCNC: 90 MG/DL

## 2025-04-14 RX ORDER — ROSUVASTATIN CALCIUM 10 MG/1
10 TABLET, COATED ORAL DAILY
Qty: 90 TABLET | Refills: 0 | Status: SHIPPED | OUTPATIENT
Start: 2025-04-14

## 2025-07-18 RX ORDER — ROSUVASTATIN CALCIUM 10 MG/1
10 TABLET, COATED ORAL DAILY
Qty: 90 TABLET | Refills: 0 | Status: SHIPPED | OUTPATIENT
Start: 2025-07-18